# Patient Record
Sex: MALE | Race: ASIAN | NOT HISPANIC OR LATINO | URBAN - METROPOLITAN AREA
[De-identification: names, ages, dates, MRNs, and addresses within clinical notes are randomized per-mention and may not be internally consistent; named-entity substitution may affect disease eponyms.]

---

## 2018-03-01 ENCOUNTER — EMERGENCY (EMERGENCY)
Facility: HOSPITAL | Age: 28
LOS: 1 days | Discharge: HOME | End: 2018-03-01
Attending: EMERGENCY MEDICINE | Admitting: EMERGENCY MEDICINE
Payer: COMMERCIAL

## 2018-03-01 VITALS
DIASTOLIC BLOOD PRESSURE: 79 MMHG | TEMPERATURE: 98 F | HEART RATE: 68 BPM | OXYGEN SATURATION: 100 % | RESPIRATION RATE: 16 BRPM | SYSTOLIC BLOOD PRESSURE: 143 MMHG

## 2018-03-01 PROCEDURE — 73590 X-RAY EXAM OF LOWER LEG: CPT | Mod: 26,LT

## 2018-03-01 PROCEDURE — 99283 EMERGENCY DEPT VISIT LOW MDM: CPT

## 2018-03-01 PROCEDURE — 73562 X-RAY EXAM OF KNEE 3: CPT | Mod: 26,LT

## 2018-03-01 RX ORDER — OXYCODONE HYDROCHLORIDE 5 MG/1
1 TABLET ORAL
Qty: 10 | Refills: 0 | OUTPATIENT
Start: 2018-03-01

## 2018-03-01 RX ORDER — AMOXICILLIN 250 MG/5ML
1 SUSPENSION, RECONSTITUTED, ORAL (ML) ORAL
Qty: 21 | Refills: 0 | OUTPATIENT
Start: 2018-03-01 | End: 2018-03-07

## 2018-03-01 RX ORDER — IBUPROFEN 200 MG
600 TABLET ORAL ONCE
Qty: 0 | Refills: 0 | Status: COMPLETED | OUTPATIENT
Start: 2018-03-01 | End: 2018-03-01

## 2018-03-01 RX ADMIN — Medication 600 MILLIGRAM(S): at 13:25

## 2018-03-01 NOTE — ED PROCEDURE NOTE - PROCEDURE ADDITIONAL DETAILS
Ace wrap and knee immobilizer applied LLE. Crutches given. Pt tolerated well. Had no problems using crutches

## 2018-03-01 NOTE — ED PROVIDER NOTE - PHYSICAL EXAMINATION
pt no distress. from home with crutches. no sign of head/facial trauma. supple neck.   left knee mild swelling of the lateral aspect. no deformity. no lac/abrasion. no ecchymosis. no laxity of knee.

## 2018-03-01 NOTE — ED PROVIDER NOTE - OBJECTIVE STATEMENT
28 y/o M with no pertinent PMHx, presents to the ED with complaint of L knee pain and swelling. Pt notes that he fell, and denies hitting his head or passing out. Pt notes that he's been using ice and a compression sleeve. He is otherwise well and has no other complaints. 26 y/o M with no pertinent PMHx, presents to the ED with complaint of L knee pain and swelling. States he was playing soccer on Sunday (4 days) with new cleats and fell. Pt notes that he fell, and denies hitting his head or passing out. Pt notes that he's been using ice, talking Tylenol  and Motrin for pain. He is otherwise well and has no other complaints. no headache no neck pain. Did not take anything for pain today.

## 2023-04-08 ENCOUNTER — EMERGENCY (EMERGENCY)
Facility: HOSPITAL | Age: 33
LOS: 1 days | Discharge: HOME | End: 2023-04-08
Admitting: EMERGENCY MEDICINE
Payer: MEDICAID

## 2023-04-08 VITALS
SYSTOLIC BLOOD PRESSURE: 133 MMHG | HEART RATE: 69 BPM | OXYGEN SATURATION: 100 % | TEMPERATURE: 98 F | DIASTOLIC BLOOD PRESSURE: 76 MMHG | RESPIRATION RATE: 18 BRPM

## 2023-04-08 PROCEDURE — 12011 RPR F/E/E/N/L/M 2.5 CM/<: CPT | Mod: RT

## 2023-04-08 PROCEDURE — 99283 EMERGENCY DEPT VISIT LOW MDM: CPT | Mod: 25

## 2023-04-08 NOTE — ED PROVIDER NOTE - OBJECTIVE STATEMENT
Patient is a 32-year-old male with no pertinent past medical history presenting with laceration 2 hours ago.  Patient reports he was playing soccer when he was elbow abdomen right eyebrow with associated oozing, nonpulsatile bleeding.  Patient reports no associated LOC.  Patient denies any fevers, chills, numbness, weakness, changes in vision or hearing, double vision, headache, neck pain, nausea, vomiting, use of blood thinners.

## 2023-04-08 NOTE — ED PROVIDER NOTE - CLINICAL SUMMARY MEDICAL DECISION MAKING FREE TEXT BOX
Patient is a 32-year-old male with no pertinent past medical history presenting with laceration 2 hours ago.  Patient reports he was playing soccer when he was elbow abdomen right eyebrow with associated oozing, nonpulsatile bleeding.  Patient reports no associated LOC.  Patient denies any fevers, chills, numbness, weakness, changes in vision or hearing, double vision, headache, neck pain, nausea, vomiting, use of blood thinners.  This is a patient with a laceration; very low clinical suspicion for disease processes including but not limited to maxillofacial fracture, intracranial hemorrhage.  Patient reports tetanus shot less than 10 years ago.  Will perform laceration repair.  Anticipate discharge.

## 2023-04-08 NOTE — ED PROVIDER NOTE - NSFOLLOWUPINSTRUCTIONS_ED_ALL_ED_FT
Follow up with your PMD within 48-72 hours for wound check. Keep sutures covered and dry for 24 hours then clean with soap and tepid water daily.  Apply bacitracin and cover.  Return to ED for suture removal in 5 days. Please return immediately to the Emergency Department if you have any worsening or change in your condition or if you have any other problems or concerns at all, including but not limited to any increased pain, redness, streaking (red lines), swelling, fever, chills. Follow up with your PMD within 48-72 hours for wound check. Keep sutures covered and dry for 24 hours then clean with soap and tepid water daily.  Apply bacitracin and cover.  Return to ED for suture removal in 5 days. Please return immediately to the Emergency Department if you have any worsening or change in your condition or if you have any other problems or concerns at all, including but not limited to any increased pain, redness, streaking (red lines), swelling, fever, chills.        Log Out.  [employer] CareNotes®  :  [facility] FOR YOUR STITCHES - General Information    Care For Your Stitches    WHAT YOU NEED TO KNOW:    What are stitches? Stitches, or sutures, are used to close cuts and wounds on the skin. Stitches need to be removed after your wound has healed.      How do I care for my stitches?    Protect the stitches. You may need to cover your stitches with a bandage for 24 to 48 hours, or as directed. Do not bump or hit the suture area. This could open the wound. Do not trim or shorten the ends of your stitches. If they rub on your clothing, put a gauze bandage between the stitches and your clothes.    Clean the area as directed. Carefully wash your wound with soap and water. For mouth and lip wounds, rinse your mouth after meals and at bedtime. Ask your healthcare provider what to use to rinse your mouth. If you have a scalp wound, you may gently wash your hair every 2 days with mild shampoo. Do not use hair products, such as hair spray. Check your wound for signs of infection when you clean it. Signs include redness, swelling, and pus.    Keep the area dry as directed. Wait 12 to 24 hours after you receive your stitches before you take a shower. Take showers instead of baths. Do not take a bath or swim. Your healthcare provider will give you instructions for bathing with your stitches.  What can I do to help my wound heal?    Elevate your wound. Keep your wound above the level of your heart as often as you can. This will help decrease swelling and pain. Prop the area on pillows or blankets, if possible, to keep it elevated comfortably.    Limit activity. Do not stretch the skin around your wound. This will help prevent bleeding and swelling.  When should I seek immediate care?    Your stitches come apart.    Blood soaks through your bandages.    You suddenly cannot move your injured joint.    You have sudden numbness around your wound.    You see red streaks coming from your wound.  When should I contact my healthcare provider?    You have a fever and chills.    Your wound is red, warm, swollen, or leaking pus.    There is a bad smell coming from your wound.    You have increased pain in the wound area.    You have questions or concerns about your condition or care.  CARE AGREEMENT:    You have the right to help plan your care. Learn about your health condition and how it may be treated. Discuss treatment options with your healthcare providers to decide what care you want to receive. You always have the right to refuse treatment.    © [employer] 1973, 2023    	  back to top            © [employer] 1973, 2023

## 2023-04-08 NOTE — ED PROVIDER NOTE - PATIENT PORTAL LINK FT
You can access the FollowMyHealth Patient Portal offered by [facility] by registering at the following website: [email or web address]. By joining [facility]’s FollowMyHealth portal, you will also be able to view your health information using other applications (apps) compatible with our system.

## 2023-04-08 NOTE — ED PROVIDER NOTE - PROGRESS NOTE DETAILS
PA BARAHONA:  Laceration repair performed. Procedure tolerated well.  Pt medically stable for discharge.  Strict return precautions given.  Pt to follow up in ED for suture removal.

## 2023-04-09 NOTE — ED ADULT NURSE NOTE - NSFALLRSKASSESSDT_ED_ALL_ED

## 2023-04-09 NOTE — ED ADULT NURSE NOTE - OBJECTIVE STATEMENT
pt received intake9. pt A&Ox4 respirations even unlabored completing full sentences. pt presenting with eyebrow laceration. pt states he was playing soccer when he hit, presents with right eyebrow laceration. bleeding controlled at this time.  Patient denies associated LOC.  pt denies any fevers, chills, numbness, weakness, changes in vision or hearing, double vision, headache, neck pain, nausea, vomiting, use of blood thinners. MD at bedside for further eval. safety maintained.